# Patient Record
Sex: MALE | Race: WHITE | NOT HISPANIC OR LATINO | Employment: OTHER | URBAN - METROPOLITAN AREA
[De-identification: names, ages, dates, MRNs, and addresses within clinical notes are randomized per-mention and may not be internally consistent; named-entity substitution may affect disease eponyms.]

---

## 2024-02-23 ENCOUNTER — OFFICE VISIT (OUTPATIENT)
Age: 74
End: 2024-02-23
Payer: COMMERCIAL

## 2024-02-23 ENCOUNTER — APPOINTMENT (OUTPATIENT)
Dept: RADIOLOGY | Facility: CLINIC | Age: 74
End: 2024-02-23
Payer: COMMERCIAL

## 2024-02-23 VITALS
SYSTOLIC BLOOD PRESSURE: 123 MMHG | WEIGHT: 180 LBS | BODY MASS INDEX: 23.86 KG/M2 | HEART RATE: 70 BPM | HEIGHT: 73 IN | DIASTOLIC BLOOD PRESSURE: 81 MMHG

## 2024-02-23 DIAGNOSIS — M25.562 LEFT KNEE PAIN, UNSPECIFIED CHRONICITY: ICD-10-CM

## 2024-02-23 DIAGNOSIS — Z01.89 ENCOUNTER FOR LOWER EXTREMITY COMPARISON IMAGING STUDY: ICD-10-CM

## 2024-02-23 DIAGNOSIS — M25.562 LEFT KNEE PAIN, UNSPECIFIED CHRONICITY: Primary | ICD-10-CM

## 2024-02-23 DIAGNOSIS — M17.12 PRIMARY OSTEOARTHRITIS OF LEFT KNEE: ICD-10-CM

## 2024-02-23 PROCEDURE — 99203 OFFICE O/P NEW LOW 30 MIN: CPT | Performed by: ORTHOPAEDIC SURGERY

## 2024-02-23 PROCEDURE — 73562 X-RAY EXAM OF KNEE 3: CPT

## 2024-02-23 PROCEDURE — 73564 X-RAY EXAM KNEE 4 OR MORE: CPT

## 2024-02-23 PROCEDURE — 20610 DRAIN/INJ JOINT/BURSA W/O US: CPT | Performed by: ORTHOPAEDIC SURGERY

## 2024-02-23 RX ORDER — ARIPIPRAZOLE 5 MG/1
5 TABLET ORAL DAILY
COMMUNITY
Start: 2024-01-19

## 2024-02-23 RX ORDER — MELOXICAM 15 MG/1
15 TABLET ORAL DAILY
Qty: 30 TABLET | Refills: 1 | Status: SHIPPED | OUTPATIENT
Start: 2024-02-23

## 2024-02-23 RX ORDER — VENLAFAXINE HYDROCHLORIDE 225 MG/1
TABLET, EXTENDED RELEASE ORAL
COMMUNITY
Start: 2024-01-19

## 2024-02-23 RX ORDER — VALACYCLOVIR HYDROCHLORIDE 1 G/1
TABLET, FILM COATED ORAL
COMMUNITY
Start: 2024-01-18

## 2024-02-23 RX ADMIN — BUPIVACAINE HYDROCHLORIDE 4 ML: 5 INJECTION, SOLUTION PERINEURAL at 14:30

## 2024-02-23 RX ADMIN — TRIAMCINOLONE ACETONIDE 40 MG: 40 INJECTION, SUSPENSION INTRA-ARTICULAR; INTRAMUSCULAR at 14:30

## 2024-02-23 NOTE — PROGRESS NOTES
"Assessment/Plan:  1. Left knee pain, unspecified chronicity  XR knee 4+ vw left injury      2. Encounter for lower extremity comparison imaging study  XR knee 3 vw right non injury      3. Primary osteoarthritis of left knee  meloxicam (Mobic) 15 mg tablet    Large joint arthrocentesis: L knee    bupivacaine (MARCAINE) 0.5 % injection 4 mL    triamcinolone acetonide (KENALOG-40) 40 mg/mL injection 40 mg        Scribe Attestation    I,:   am acting as a scribe while in the presence of the attending physician.:       I,:   personally performed the services described in this documentation    as scribed in my presence.:             73-year-old male presents for evaluation of his left knee.  He has tricompartmental arthritis of the left knee seen on x-rays today.  We discussed the diagnosis and went over the x-rays together today.  We discussed treatment options including operative and nonoperative treatments.  He had a total knee arthroplasty on the contralateral side but is not ready for 1 in the left side yet.  Will continue with conservative treatment at this time.  We discussed physical therapy, injections, bracing, anti-inflammatory medications, assistive devices for walking.  Plan to start with physical therapy and home exercise plan which was put in his chart today.  He was also given a prescription for meloxicam today.  We also discussed injections and he would like to start with a cortisone injection.  After consent was well-tolerated and helped with his pain.  We also discussed viscosupplementation in the future which she is interested in if the steroid does not help.  All questions were answered.  Plan to follow-up in about 3 months or sooner.    Large joint arthrocentesis: L knee  Universal Protocol:  Consent given by: patient  Time out: Immediately prior to procedure a \"time out\" was called to verify the correct patient, procedure, equipment, support staff and site/side marked as required.  Site marked: the " operative site was marked  Supporting Documentation  Indications: pain   Procedure Details  Location: knee - L knee  Preparation: Patient was prepped and draped in the usual sterile fashion  Needle size: 22 G  Ultrasound guidance: no  Approach: anterolateral  Medications administered: 4 mL bupivacaine 0.5 %; 40 mg triamcinolone acetonide 40 mg/mL    Patient tolerance: patient tolerated the procedure well with no immediate complications  Dressing:  Sterile dressing applied            Subjective:   Mike Márquez is a 73 y.o. male who presents chronic left knee pain.  Has a history of a right total knee arthroplasty done years ago.  His left knee now feels similar to his right knee did before the replacement however not as severe.  He is not ready for knee replacement at this time.  He asked the active with walking.  He is retired at this point.  He has some swelling from time to time in his knee.  Most of his pain is on the medial aspect of his knee and some patellofemoral pain.  Has not tried any physical therapy.  He has tried some Mobic in the past with good relief.      Review of Systems   Constitutional: Negative.  Negative for chills and fever.   HENT: Negative.  Negative for ear pain and sore throat.    Eyes: Negative.  Negative for pain and redness.   Respiratory: Negative.  Negative for shortness of breath and wheezing.    Cardiovascular:  Negative for chest pain and palpitations.   Gastrointestinal: Negative.  Negative for abdominal pain and blood in stool.   Endocrine: Negative.  Negative for polydipsia and polyuria.   Genitourinary: Negative.  Negative for difficulty urinating and dysuria.   Musculoskeletal:         As noted in HPI   Skin: Negative.  Negative for pallor and rash.   Neurological: Negative.  Negative for dizziness and numbness.   Hematological: Negative.  Negative for adenopathy. Does not bruise/bleed easily.   Psychiatric/Behavioral: Negative.  Negative for confusion and suicidal ideas.           History reviewed. No pertinent past medical history.    Past Surgical History:   Procedure Laterality Date   • HERNIA REPAIR     • KNEE SURGERY Right 2011    total knee replacement       History reviewed. No pertinent family history.    Social History     Occupational History   • Not on file   Tobacco Use   • Smoking status: Never   • Smokeless tobacco: Never   Vaping Use   • Vaping status: Never Used   Substance and Sexual Activity   • Alcohol use: Yes     Comment: pint a day   • Drug use: Yes     Types: Marijuana     Comment: every day   • Sexual activity: Not on file         Current Outpatient Medications:   •  ARIPiprazole (ABILIFY) 5 mg tablet, Take 5 mg by mouth daily, Disp: , Rfl:   •  meloxicam (Mobic) 15 mg tablet, Take 1 tablet (15 mg total) by mouth daily, Disp: 30 tablet, Rfl: 1  •  valACYclovir (VALTREX) 1,000 mg tablet, , Disp: , Rfl:   •  venlafaxine 225 MG TB24 24 hr tablet, TAKE ONE TABLET BY MOUTH EVERY DAY IN THE MORNING AT THE SAME TIME EACH DAY WITH FOOD, Disp: , Rfl:     No Known Allergies    Objective:  Vitals:    02/23/24 1416   BP: 123/81   Pulse: 70       Right Knee Exam     Range of Motion   Extension:  0   Flexion:  120     Tests   Swati:  Medial - negative Lateral - negative  Varus: negative Valgus: negative  Lachman:  Anterior - negative      Drawer:  Anterior - negative    Posterior - negative  Pivot shift: negative  Patellar apprehension: negative    Other   Erythema: absent  Scars: absent  Sensation: normal  Pulse: present  Swelling: none  Effusion: no effusion present    Comments:  Palpable cyst present at the lateral left retinaculum, mobile.  No pain.      Left Knee Exam     Muscle Strength   The patient has normal left knee strength.    Tenderness   The patient is experiencing tenderness in the medial joint line.    Range of Motion   Left knee extension: -5.   Flexion:  120     Tests   Swati:  Medial - negative Lateral - negative  Varus: negative Valgus:  negative  Lachman:  Anterior - negative      Drawer:  Anterior - negative     Posterior - negative  Pivot shift: negative  Patellar apprehension: negative    Other   Erythema: absent  Scars: absent  Sensation: normal  Pulse: present  Swelling: none  Effusion: no effusion present          Observations   Left Knee   Negative for effusion.     Right Knee   Negative for effusion.       Physical Exam  Constitutional:       General: He is not in acute distress.     Appearance: He is well-developed.   HENT:      Head: Normocephalic and atraumatic.   Eyes:      General: No scleral icterus.     Conjunctiva/sclera: Conjunctivae normal.   Neck:      Vascular: No JVD.   Cardiovascular:      Rate and Rhythm: Normal rate.   Pulmonary:      Effort: Pulmonary effort is normal. No respiratory distress.   Musculoskeletal:      Right knee: No effusion.      Instability Tests: Medial Swati test negative and lateral Swati test negative.      Left knee: No effusion.      Instability Tests: Medial Swati test negative and lateral Swati test negative.      Comments: As per HPI   Skin:     General: Skin is warm.   Neurological:      Mental Status: He is alert and oriented to person, place, and time.      Coordination: Coordination normal.         I have personally reviewed pertinent films in PACS and my interpretation is as follows:  4 views of the left knee performed in the office today which show moderate to severe tricompartmental osteoarthritis.      This document was created using speech voice recognition software.   Grammatical errors, random word insertions, pronoun errors, and incomplete sentences are an occasional consequence of this system due to software limitations, ambient noise, and hardware issues.   Any formal questions or concerns about content, text, or information contained within the body of this dictation should be directly addressed to the provider for clarification.

## 2024-02-27 RX ORDER — TRIAMCINOLONE ACETONIDE 40 MG/ML
40 INJECTION, SUSPENSION INTRA-ARTICULAR; INTRAMUSCULAR
Status: COMPLETED | OUTPATIENT
Start: 2024-02-23 | End: 2024-02-23

## 2024-02-27 RX ORDER — BUPIVACAINE HYDROCHLORIDE 5 MG/ML
4 INJECTION, SOLUTION PERINEURAL
Status: COMPLETED | OUTPATIENT
Start: 2024-02-23 | End: 2024-02-23